# Patient Record
Sex: MALE | Race: WHITE | HISPANIC OR LATINO
[De-identification: names, ages, dates, MRNs, and addresses within clinical notes are randomized per-mention and may not be internally consistent; named-entity substitution may affect disease eponyms.]

---

## 2017-07-20 ENCOUNTER — TRANSCRIPTION ENCOUNTER (OUTPATIENT)
Age: 57
End: 2017-07-20

## 2017-08-15 ENCOUNTER — TRANSCRIPTION ENCOUNTER (OUTPATIENT)
Age: 57
End: 2017-08-15

## 2019-03-07 ENCOUNTER — OUTPATIENT (OUTPATIENT)
Dept: OUTPATIENT SERVICES | Facility: HOSPITAL | Age: 59
LOS: 1 days | Discharge: HOME | End: 2019-03-07

## 2019-03-07 DIAGNOSIS — E78.00 PURE HYPERCHOLESTEROLEMIA, UNSPECIFIED: ICD-10-CM

## 2019-03-07 DIAGNOSIS — I10 ESSENTIAL (PRIMARY) HYPERTENSION: ICD-10-CM

## 2019-03-07 DIAGNOSIS — D51.0 VITAMIN B12 DEFICIENCY ANEMIA DUE TO INTRINSIC FACTOR DEFICIENCY: ICD-10-CM

## 2019-07-23 ENCOUNTER — TRANSCRIPTION ENCOUNTER (OUTPATIENT)
Age: 59
End: 2019-07-23

## 2019-12-10 ENCOUNTER — TRANSCRIPTION ENCOUNTER (OUTPATIENT)
Age: 59
End: 2019-12-10

## 2020-09-22 ENCOUNTER — OUTPATIENT (OUTPATIENT)
Dept: OUTPATIENT SERVICES | Facility: HOSPITAL | Age: 60
LOS: 1 days | Discharge: HOME | End: 2020-09-22
Payer: MEDICARE

## 2020-09-22 DIAGNOSIS — R91.1 SOLITARY PULMONARY NODULE: ICD-10-CM

## 2020-09-22 PROCEDURE — 71250 CT THORAX DX C-: CPT | Mod: 26

## 2020-09-25 PROBLEM — Z00.00 ENCOUNTER FOR PREVENTIVE HEALTH EXAMINATION: Status: ACTIVE | Noted: 2020-09-25

## 2020-10-12 ENCOUNTER — APPOINTMENT (OUTPATIENT)
Dept: UROLOGY | Facility: CLINIC | Age: 60
End: 2020-10-12
Payer: MEDICARE

## 2020-10-12 ENCOUNTER — LABORATORY RESULT (OUTPATIENT)
Age: 60
End: 2020-10-12

## 2020-10-12 VITALS — WEIGHT: 165 LBS | TEMPERATURE: 97.3 F | HEIGHT: 67 IN | BODY MASS INDEX: 25.9 KG/M2

## 2020-10-12 DIAGNOSIS — Z78.9 OTHER SPECIFIED HEALTH STATUS: ICD-10-CM

## 2020-10-12 DIAGNOSIS — I10 ESSENTIAL (PRIMARY) HYPERTENSION: ICD-10-CM

## 2020-10-12 PROCEDURE — 99204 OFFICE O/P NEW MOD 45 MIN: CPT

## 2020-10-12 RX ORDER — METFORMIN HYDROCHLORIDE 1000 MG/1
1000 TABLET, COATED ORAL
Refills: 0 | Status: ACTIVE | COMMUNITY

## 2020-10-12 RX ORDER — ATORVASTATIN CALCIUM 20 MG/1
20 TABLET, FILM COATED ORAL
Refills: 0 | Status: ACTIVE | COMMUNITY

## 2020-10-12 RX ORDER — LISINOPRIL 40 MG/1
40 TABLET ORAL
Refills: 0 | Status: ACTIVE | COMMUNITY

## 2020-10-12 RX ORDER — SITAGLIPTIN 100 MG/1
100 TABLET, FILM COATED ORAL
Refills: 0 | Status: ACTIVE | COMMUNITY

## 2020-10-12 NOTE — PHYSICAL EXAM
[General Appearance - Well Developed] : well developed [General Appearance - Well Nourished] : well nourished [Normal Appearance] : normal appearance [Well Groomed] : well groomed [General Appearance - In No Acute Distress] : no acute distress [Edema] : no peripheral edema [Respiration, Rhythm And Depth] : normal respiratory rhythm and effort [Exaggerated Use Of Accessory Muscles For Inspiration] : no accessory muscle use [Abdomen Soft] : soft [Abdomen Tenderness] : non-tender [Costovertebral Angle Tenderness] : no ~M costovertebral angle tenderness [Urethral Meatus] : meatus normal [Urinary Bladder Findings] : the bladder was normal on palpation [Scrotum] : the scrotum was normal [Testes Mass (___cm)] : there were no testicular masses [FreeTextEntry1] : multiple 1-2 mm lesions distal shaft of penis flesh colored lesions w dimple [Normal Station and Gait] : the gait and station were normal for the patient's age [] : no rash [No Focal Deficits] : no focal deficits [Oriented To Time, Place, And Person] : oriented to person, place, and time [Affect] : the affect was normal [Mood] : the mood was normal [Not Anxious] : not anxious [No Palpable Adenopathy] : no palpable adenopathy

## 2020-10-12 NOTE — HISTORY OF PRESENT ILLNESS
[FreeTextEntry1] : ALFRED CARREON is a 60 year old male hx DM who presents for consultation for genital lesions and ED.\par \par Pt reports that he recently noticed non tender lesions on his penis. Denies LUTS. He is in a relationship w his gf who is present today, 6 mo relationship.\par Denies gross hematuria, dysuria or associated symptoms. Denies flank pain.\par Also has ED states improved w sildenafil 100mg previously but was too expensive.can walk 2 flights fo stairs no cp sob\par \par Denies  PMH including previous kidney stones, recurrent UTIs. \par Family History: No  malignancies\par Social History:retired automechanic\par

## 2020-10-12 NOTE — ASSESSMENT
[FreeTextEntry1] : ALFRED CARREON is a 60 year old male hx DM who presents for consultation for genital lesions characteristic of molluscum contagiosum and ED.\par \par Explained to the patient that this is sexually transmitted disease and that he must use condoms to prevent spread.\par We discussed options including topical therapies versus excision.  We discussed that the benefit of excision is that we would obtain a pathological diagnosis however the patient declined.\par He would like to first start with topical therapy and understands that there may be a disagreeable aesthetic result in scarring from the topical therapy.  He understands to use only a small toothpick tip amount.\par \par sildenafil\par \par Based on the patient's history, he was prescribed a PDE5 inhibior. The patient understands that the risks of this medication include facial redness, flushing, nasal congestion, GERD, back pain, priapism, chest pain/MI, dizziness, drop in BP, changes in vision. ER precautions were given. The patient has been screened for potential medication interactions. He is not on any nitrates, po antifungals or HIV meds. \par  I recommended that the patient take the first dose by himself. He knows that the medication requires approximately 45 minutes to have effect and works best on an empty stomach. He is aware sexual stimulation is required.\par \par \par

## 2020-10-14 LAB
BACTERIA UR CULT: NORMAL
C TRACH RRNA SPEC QL NAA+PROBE: NOT DETECTED
HBV SURFACE AB SER QL: NONREACTIVE
HBV SURFACE AG SER QL: NONREACTIVE
HCV AB SER QL: NONREACTIVE
HCV S/CO RATIO: 0.15 S/CO
HIV1+2 AB SPEC QL IA.RAPID: NONREACTIVE
N GONORRHOEA RRNA SPEC QL NAA+PROBE: NOT DETECTED
SOURCE AMPLIFICATION: NORMAL

## 2020-11-10 ENCOUNTER — APPOINTMENT (OUTPATIENT)
Dept: UROLOGY | Facility: CLINIC | Age: 60
End: 2020-11-10

## 2020-12-22 ENCOUNTER — APPOINTMENT (OUTPATIENT)
Dept: UROLOGY | Facility: CLINIC | Age: 60
End: 2020-12-22
Payer: MEDICARE

## 2020-12-22 VITALS — BODY MASS INDEX: 25.9 KG/M2 | WEIGHT: 165 LBS | TEMPERATURE: 97 F | HEIGHT: 67 IN

## 2020-12-22 PROCEDURE — 99214 OFFICE O/P EST MOD 30 MIN: CPT

## 2020-12-22 RX ORDER — PODOFILOX 5 MG/ML
0.5 SOLUTION TOPICAL
Qty: 1 | Refills: 0 | Status: ACTIVE | COMMUNITY
Start: 2020-10-12 | End: 1900-01-01

## 2020-12-22 NOTE — HISTORY OF PRESENT ILLNESS
[FreeTextEntry1] : ALFRED CARREON is a 60 year old male hx DM who presents for consultation for genital lesions characteristic of molluscum contagiosum and ED.\par \par States he has done very well with podofilox ointment there is one lesion which has not resolved.\par Denies any history of ulcerations of the penis\par Denies gross hematuria, dysuria or associated symptoms. Denies flank pain.\par Had good success with sildenafil 100 mg\par \par Urinalysis negative for microscopic hematuria or pyuria\par STD work-up including HIV hepatitis gc trich negative\par \par Denies  PMH including previous kidney stones, recurrent UTIs. \par Family History: No  malignancies\par Social History:retired automechanic\par

## 2020-12-22 NOTE — PHYSICAL EXAM
[General Appearance - Well Developed] : well developed [General Appearance - Well Nourished] : well nourished [Normal Appearance] : normal appearance [Well Groomed] : well groomed [General Appearance - In No Acute Distress] : no acute distress [Abdomen Soft] : soft [Abdomen Tenderness] : non-tender [Costovertebral Angle Tenderness] : no ~M costovertebral angle tenderness [Urethral Meatus] : meatus normal [Urinary Bladder Findings] : the bladder was normal on palpation [Scrotum] : the scrotum was normal [Testes Mass (___cm)] : there were no testicular masses [FreeTextEntry1] : one 3 mm condyloma vs molluscum flesh colored lesion on foreskin [Edema] : no peripheral edema [] : no respiratory distress [Respiration, Rhythm And Depth] : normal respiratory rhythm and effort [Exaggerated Use Of Accessory Muscles For Inspiration] : no accessory muscle use [Oriented To Time, Place, And Person] : oriented to person, place, and time [Affect] : the affect was normal [Mood] : the mood was normal [Not Anxious] : not anxious [Normal Station and Gait] : the gait and station were normal for the patient's age [No Focal Deficits] : no focal deficits [No Palpable Adenopathy] : no palpable adenopathy

## 2020-12-22 NOTE — ASSESSMENT
[FreeTextEntry1] : ALFRED CARREON is a 60 year old male hx DM who presents for consultation for genital lesions characteristic of molluscum contagiosum and ED. Most lesions resolved w oitnment.\par \par Remaining small lesion condyloma vs molluscum.\par Either way tx can be same w podofilox.\par We discussed options including topical therapies versus excision.  We discussed that the benefit of excision is that we would obtain a pathological diagnosis.\par He would like to first start with topical therapy and understands that there may be a disagreeable aesthetic result in scarring from the topical therapy.  He understands to use only a small toothpick tip amount.\par \par If no resolution, will excise\par \par \par

## 2021-02-16 ENCOUNTER — APPOINTMENT (OUTPATIENT)
Dept: UROLOGY | Facility: CLINIC | Age: 61
End: 2021-02-16
Payer: MEDICARE

## 2021-02-16 DIAGNOSIS — B08.1 MOLLUSCUM CONTAGIOSUM: ICD-10-CM

## 2021-02-16 PROCEDURE — 99213 OFFICE O/P EST LOW 20 MIN: CPT

## 2021-02-16 NOTE — ADDENDUM
[FreeTextEntry1] : Agree with the above documentation as outlined by the PA which I have addended as necessary.\par

## 2021-02-16 NOTE — ASSESSMENT
[FreeTextEntry1] : ALFRED CARREON is a 60 year old male hx DM who presents for consultation for genital lesions characteristic of molluscum contagiosum and ED. Most lesions resolved w treatment.\par \par Remaining lesion; f/u dermatology for further evaluation.  I explained to the patient that if he has any issues with access to a dermatologist that he should follow-up with me and then we will biopsy the lesion.\par \par F/U 6 months symptom index or earlier if necessary\par \par \par

## 2021-02-16 NOTE — HISTORY OF PRESENT ILLNESS
[FreeTextEntry1] : ALFRED CARREON is a 60 year old male hx DM who presents for consultation for genital lesions characteristic of molluscum contagiosum and ED.\par \par He has been using Podofilox with significant improvement however, there is one lesion which is still present.\par Overall he is doing well and denies any issues at this time. Has a good erections with sildenafil 100 mg.\par \par Blood work from 1/20/21:\par Creatinine 1.01\par EGFR 81\par Hemoglobin A1c 6.7\par CBC within normal limits\par PSA 1.0\par \par Previously:\par States he has done very well with podofilox ointment there is one lesion which has not resolved.\par Denies any history of ulcerations of the penis\par Denies gross hematuria, dysuria or associated symptoms. Denies flank pain.\par Had good success with sildenafil 100 mg\par \par Urinalysis negative for microscopic hematuria or pyuria\par STD work-up including HIV hepatitis gc trich negative\par \par Denies  PMH including previous kidney stones, recurrent UTIs. \par Family History: No  malignancies\par Social History:retired automechanic\par

## 2021-02-16 NOTE — PHYSICAL EXAM
[General Appearance - Well Developed] : well developed [General Appearance - Well Nourished] : well nourished [Normal Appearance] : normal appearance [Well Groomed] : well groomed [General Appearance - In No Acute Distress] : no acute distress [Abdomen Soft] : soft [Abdomen Tenderness] : non-tender [Costovertebral Angle Tenderness] : no ~M costovertebral angle tenderness [Urethral Meatus] : meatus normal [Penis Abnormality] : normal uncircumcised penis [Urinary Bladder Findings] : the bladder was normal on palpation [Scrotum] : the scrotum was normal [Testes Tenderness] : no tenderness of the testes [Testes Mass (___cm)] : there were no testicular masses [Edema] : no peripheral edema [] : no respiratory distress [Respiration, Rhythm And Depth] : normal respiratory rhythm and effort [Exaggerated Use Of Accessory Muscles For Inspiration] : no accessory muscle use [Oriented To Time, Place, And Person] : oriented to person, place, and time [Affect] : the affect was normal [Mood] : the mood was normal [Not Anxious] : not anxious [FreeTextEntry1] : Right arm in brace. Ambulating with a cane

## 2021-06-11 ENCOUNTER — OUTPATIENT (OUTPATIENT)
Dept: OUTPATIENT SERVICES | Facility: HOSPITAL | Age: 61
LOS: 1 days | Discharge: HOME | End: 2021-06-11

## 2021-08-16 ENCOUNTER — APPOINTMENT (OUTPATIENT)
Dept: UROLOGY | Facility: CLINIC | Age: 61
End: 2021-08-16
Payer: MEDICARE

## 2021-08-16 VITALS — WEIGHT: 175 LBS | HEIGHT: 68 IN | BODY MASS INDEX: 26.52 KG/M2

## 2021-08-16 DIAGNOSIS — A63.0 ANOGENITAL (VENEREAL) WARTS: ICD-10-CM

## 2021-08-16 DIAGNOSIS — N52.9 MALE ERECTILE DYSFUNCTION, UNSPECIFIED: ICD-10-CM

## 2021-08-16 PROCEDURE — 99214 OFFICE O/P EST MOD 30 MIN: CPT

## 2021-08-16 NOTE — ASSESSMENT
[FreeTextEntry1] : Terence Naidu is a 61 year old male with hx of Diabetes Mellitus. Patient has been following us for recurrent genital lesions. Patient has been seeing Dermatology and had a biopsy done which results revealed HPV. \par \par Discussed surgical options including removal of lesions +/- circumcision.  I explained my concern that with excisions this is an area with moisture and that there is a possibility of infection.\par  I recommend that he follow up w  Dermatologist for medical therapy and possible repeat cryotherapy.  Otherwise circumcision would be the correct option.\par Dermatologist names were given to patient to call and make appointment. \par \par Plan\par -Follow up as needed. \par -Follow up with dermatology. \par \par

## 2021-08-16 NOTE — PHYSICAL EXAM
[General Appearance - In No Acute Distress] : no acute distress [Testes Tenderness] : no tenderness of the testes [Penis Abnormality] : normal uncircumcised penis [Testes Mass (___cm)] : there were no testicular masses [] : no respiratory distress [Oriented To Time, Place, And Person] : oriented to person, place, and time [FreeTextEntry1] : ambulates with cane.

## 2021-08-16 NOTE — REVIEW OF SYSTEMS
[Feeling Tired] : feeling tired [see HPI] : see HPI [Fever] : no fever [Chills] : no chills [Chest Pain] : no chest pain [Shortness Of Breath] : no shortness of breath [Abdominal Pain] : no abdominal pain [Vomiting] : no vomiting

## 2021-08-16 NOTE — HISTORY OF PRESENT ILLNESS
[FreeTextEntry1] : Terence Naidu is a 61 year old male with hx of Diabetes Mellitus. Patient has been following us for recurrent genital lesions. Patient has been seeing Dermatology and had a biopsy done which results revealed Condyloma. \par \par Patient states that he had the genital lesions treated with cryotherapy. After therapy patient repots lesions came back. Currently patient has 4 reported lesions. Patient denies any pain. Denies any bleeding. \par Patient is using Podofilox which patient reports makes his genitals sensitive after prolong use. \par \par Overall patient is feeling well. Denies urinary complaints this visit. \par Continue to have good erections with Sildenafil 100 mg. \par \par Blood work from 1/20/21:\par Creatinine 1.01\par EGFR 81\par Hemoglobin A1c 6.7\par CBC within normal limits\par PSA 1.0\par

## 2022-08-04 RX ORDER — SILDENAFIL 100 MG/1
100 TABLET, FILM COATED ORAL
Qty: 30 | Refills: 11 | Status: ACTIVE | COMMUNITY
Start: 2020-10-12 | End: 1900-01-01

## 2023-05-16 ENCOUNTER — APPOINTMENT (OUTPATIENT)
Dept: OTOLARYNGOLOGY | Facility: CLINIC | Age: 63
End: 2023-05-16

## 2024-08-26 ENCOUNTER — APPOINTMENT (OUTPATIENT)
Dept: ORTHOPEDIC SURGERY | Facility: CLINIC | Age: 64
End: 2024-08-26

## 2024-08-26 DIAGNOSIS — M65.352 TRIGGER FINGER, LEFT LITTLE FINGER: ICD-10-CM

## 2024-08-26 DIAGNOSIS — M18.12 UNILATERAL PRIMARY OSTEOARTHRITIS OF FIRST CARPOMETACARPAL JOINT, LEFT HAND: ICD-10-CM

## 2024-08-26 PROCEDURE — 20550 NJX 1 TENDON SHEATH/LIGAMENT: CPT | Mod: 59,LT

## 2024-08-26 PROCEDURE — 99203 OFFICE O/P NEW LOW 30 MIN: CPT | Mod: 25

## 2024-08-26 PROCEDURE — 20605 DRAIN/INJ JOINT/BURSA W/O US: CPT | Mod: LT

## 2024-08-26 NOTE — PHYSICAL EXAM
[de-identified] : Patient is tender to palpation A1 pulley left little finger.  Also has discomfort along the left basal joint.  Positive axial grind to the left thumb.  No instability.  Is deformity present.  No flexion contractures no Dupuytren's.

## 2024-08-26 NOTE — ASSESSMENT
[FreeTextEntry1] : Patient has left basal joint arthritis.  The natural history associated this is discussed with the patient.  Use of anti-inflammatories was discussed.  And bracing was discussed.  We also has a left little finger trigger digit and the natural history of this condition was discussed as well.  Cortisone injection was discussed as well.  He is agreeable to the injection.  Elevated blood sugar was discussed.  Watching his diet over the next couple days was discussed.  He will see us back in about a month for potential second injection.  We also discussed the surgical intervention for release

## 2024-08-26 NOTE — PROCEDURE
[FreeTextEntry3] : Trigger finger injection was performed because of pain inflammation and stiffness Anesthesia: ethyl chloride sprayed topically Dexamethasone injection of 1cc 4mg/ml Lidocaine: An injection of Lidocaine 1% 1cc  Patient has tried OTC's including aspirin, Ibuprofen, Aleve etc or prescription NSAIDS, and/or exercises at home and/ or physical therapy without satisfactory response. After verbal consent using sterile preparation and technique. The risks, benefits, and alternatives to cortisone injection were explained in full to the patient. Risks outlined include but are not limited to infection, sepsis, bleeding, scarring, skin discoloration, temporary increase in pain, syncopal episode, failure to resolve symptoms, allergic reaction, symptom recurrence, and elevation of blood sugar in diabetics. Patient understood the risks. All questions were answered. After discussion of options, patient requested an injection. Oral informed consent was obtained and sterile prep was done of the injection site. Sterile technique was utilized for the procedure including the preparation of the solutions used for the injection. Patient tolerated the procedure well. Advised to ice the injection site this evening. Prep with ETOH  locally to site. Sterile technique used.  tendon sheath injected Left middle finger flexor tendon sheath    Thumb CMC injection was performed because of pain inflammation and stiffness Anesthesia: ethyl chloride sprayed topically Dexamethasone: An injection of Dexamethasone 1cc  4mg/ml Lidocaine: An injection of Lidocaine 1% 1cc  Patient has tried OTC's including aspirin, Ibuprofen, Aleve etc or prescription NSAIDS, and/or exercises at home and/ or physical therapy without satisfactory response. After verbal consent using sterile preparation and technique. The risks, benefits, and alternatives to cortisone injection were explained in full to the patient. Risks outlined include but are not limited to infection, sepsis, bleeding, scarring, skin discoloration, temporary increase in pain, syncopal episode, failure to resolve symptoms, allergic reaction, symptom recurrence, and elevation of blood sugar in diabetics. Patient understood the risks. All questions were answered. After discussion of options, patient requested an injection. Oral informed consent was obtained and sterile prep was done of the injection site. Sterile technique was utilized for the procedure including the preparation of the solutions used for the injection. Patient tolerated the procedure well. Advised to ice the injection site this evening. Prep with ETOH locally to site. Sterile technique used.  The basal joint of the thumb was injected  Left thumb basal joint

## 2024-08-26 NOTE — HISTORY OF PRESENT ILLNESS
[de-identified] : 64-year-old male limited function in his right arm.  He has complaints of pain discomfort in his left hand.  He has 2 complaints.  One is locking of the left little finger.  And the other is pain discomfort on the base of the left thumb.  He comes in today for evaluation.  He had no treatment for the condition.  Does have a history of diabetes

## 2024-09-27 ENCOUNTER — APPOINTMENT (OUTPATIENT)
Dept: ORTHOPEDIC SURGERY | Facility: CLINIC | Age: 64
End: 2024-09-27

## 2024-09-27 DIAGNOSIS — M65.352 TRIGGER FINGER, LEFT LITTLE FINGER: ICD-10-CM

## 2024-09-27 PROCEDURE — 20550 NJX 1 TENDON SHEATH/LIGAMENT: CPT | Mod: LT

## 2024-09-27 NOTE — HISTORY OF PRESENT ILLNESS
[de-identified] : 64-year-old male has a left little finger trigger digit.  He also has basal joint arthritis.  He was injected both last time.  Got a little bit better in the little finger.  Not much better in the thumb.

## 2024-09-27 NOTE — PROCEDURE
[FreeTextEntry3] : Trigger finger injection was performed because of pain inflammation and stiffness Anesthesia: ethyl chloride sprayed topically Dexamethasone injection of 1cc 4mg/ml Lidocaine: An injection of Lidocaine 1% 1cc  Patient has tried OTC's including aspirin, Ibuprofen, Aleve etc or prescription NSAIDS, and/or exercises at home and/ or physical therapy without satisfactory response. After verbal consent using sterile preparation and technique. The risks, benefits, and alternatives to cortisone injection were explained in full to the patient. Risks outlined include but are not limited to infection, sepsis, bleeding, scarring, skin discoloration, temporary increase in pain, syncopal episode, failure to resolve symptoms, allergic reaction, symptom recurrence, and elevation of blood sugar in diabetics. Patient understood the risks. All questions were answered. After discussion of options, patient requested an injection. Oral informed consent was obtained and sterile prep was done of the injection site. Sterile technique was utilized for the procedure including the preparation of the solutions used for the injection. Patient tolerated the procedure well. Advised to ice the injection site this evening. Prep with ETOH  locally to site. Sterile technique used.  tendon sheath injected Left little finger flexor tendon sheath

## 2024-09-27 NOTE — PHYSICAL EXAM
[de-identified] : Patient is tender to palpation A1 pulley left little finger.  There is clear triggering present.  Normal capillary refill.  Also has deformity along the basal joint. 0

## 2024-09-27 NOTE — ASSESSMENT
[FreeTextEntry1] : Patient left little finger trigger digit.  He will try a second injection.  If the injections do not help he will consider surgical intervention for release.  Recovery from that was discussed with the patient.  Not recommending any treatment at this time for his basal joint arthritis other than bracing and anti-inflammatories.  Intervention would put his left arm in a cast for a while and he has no use of the right arm

## 2025-06-30 ENCOUNTER — APPOINTMENT (OUTPATIENT)
Dept: ORTHOPEDIC SURGERY | Facility: CLINIC | Age: 65
End: 2025-06-30